# Patient Record
Sex: FEMALE | Race: WHITE | NOT HISPANIC OR LATINO | URBAN - METROPOLITAN AREA
[De-identification: names, ages, dates, MRNs, and addresses within clinical notes are randomized per-mention and may not be internally consistent; named-entity substitution may affect disease eponyms.]

---

## 2020-10-07 ENCOUNTER — OFFICE VISIT (OUTPATIENT)
Dept: URBAN - METROPOLITAN AREA CLINIC 78 | Facility: CLINIC | Age: 75
End: 2020-10-07
Payer: MEDICARE

## 2020-10-07 DIAGNOSIS — K92.2 GI (GASTROINTESTINAL BLEED): ICD-10-CM

## 2020-10-07 DIAGNOSIS — D62 ANEMIA ASSOCIATED WITH ACUTE BLOOD LOSS: ICD-10-CM

## 2020-10-07 DIAGNOSIS — I25.10 CAD (CORONARY ARTERY DISEASE): ICD-10-CM

## 2020-10-07 PROCEDURE — 99204 OFFICE O/P NEW MOD 45 MIN: CPT | Performed by: INTERNAL MEDICINE

## 2020-10-07 RX ORDER — OLMESARTAN MEDOXOMIL 5 MG/1
1 TABLET TABLET ORAL ONCE A DAY
Status: ACTIVE | COMMUNITY

## 2020-10-07 NOTE — HPI-TODAY'S VISIT:
Patient is seen in consultation for ulcer  She has Transfusion in [facility] .and dx with ulcer  2 years ago  She had repeat EGD by Dr Estephania Gillette  2 years ago..Ulcer was still there . She has been bleeding on and off from ulcer because she was not taking her medicine and her  was bad  Saw gastro in [place]  Took her to Dr Capellan..ordered 2 iron infusions ( EGD not covered by insurance ) Patient was hospitalized  1 year ago...Rocky Coreas /NS had repeat EGD by  Dr Estephania Gillette 5/17/10  Non bleeding gastric ulcers   Colonoscopy was done a year or two years ago ..Polyps was removed .   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  BM are ok  Hx of melena 1 month ago.

## 2020-11-06 ENCOUNTER — OFFICE VISIT (OUTPATIENT)
Dept: URBAN - METROPOLITAN AREA SURGERY CENTER 15 | Facility: SURGERY CENTER | Age: 75
End: 2020-11-06
Payer: MEDICARE

## 2020-11-06 DIAGNOSIS — K44.9 DIAPHRAGMATIC HERNIA: ICD-10-CM

## 2020-11-06 DIAGNOSIS — K31.89 ACQUIRED DEFORMITY OF DUODENUM: ICD-10-CM

## 2020-11-06 PROCEDURE — 43239 EGD BIOPSY SINGLE/MULTIPLE: CPT | Performed by: INTERNAL MEDICINE

## 2020-11-06 PROCEDURE — G8907 PT DOC NO EVENTS ON DISCHARG: HCPCS | Performed by: INTERNAL MEDICINE

## 2020-11-06 RX ORDER — OLMESARTAN MEDOXOMIL 5 MG/1
1 TABLET TABLET ORAL ONCE A DAY
Status: ACTIVE | COMMUNITY

## 2021-08-20 ENCOUNTER — OFFICE VISIT (OUTPATIENT)
Dept: URBAN - METROPOLITAN AREA CLINIC 78 | Facility: CLINIC | Age: 76
End: 2021-08-20
Payer: MEDICARE

## 2021-08-20 ENCOUNTER — LAB OUTSIDE AN ENCOUNTER (OUTPATIENT)
Dept: URBAN - METROPOLITAN AREA CLINIC 78 | Facility: CLINIC | Age: 76
End: 2021-08-20

## 2021-08-20 VITALS
DIASTOLIC BLOOD PRESSURE: 71 MMHG | HEART RATE: 61 BPM | HEIGHT: 61 IN | BODY MASS INDEX: 29.49 KG/M2 | WEIGHT: 156.2 LBS | TEMPERATURE: 97.3 F | SYSTOLIC BLOOD PRESSURE: 162 MMHG

## 2021-08-20 DIAGNOSIS — K92.2 GI (GASTROINTESTINAL BLEED): ICD-10-CM

## 2021-08-20 DIAGNOSIS — K25.7: ICD-10-CM

## 2021-08-20 DIAGNOSIS — D64.89 ANEMIA DUE TO OTHER CAUSE: ICD-10-CM

## 2021-08-20 DIAGNOSIS — Z87.11 HISTORY OF PEPTIC ULCER DISEASE: ICD-10-CM

## 2021-08-20 PROCEDURE — 99215 OFFICE O/P EST HI 40 MIN: CPT | Performed by: INTERNAL MEDICINE

## 2021-08-20 RX ORDER — SODIUM PICOSULFATE, MAGNESIUM OXIDE, AND ANHYDROUS CITRIC ACID 10; 3.5; 12 MG/160ML; G/160ML; G/160ML
160ML AS DIRECTED LIQUID ORAL ONCE
Qty: 1 | Refills: 0 | OUTPATIENT
Start: 2021-08-20 | End: 2021-08-21

## 2021-08-20 RX ORDER — OLMESARTAN MEDOXOMIL 5 MG/1
1 TABLET TABLET ORAL ONCE A DAY
Status: ON HOLD | COMMUNITY

## 2021-08-20 RX ORDER — PANTOPRAZOLE SODIUM 40 MG/1
1 TABLET TABLET, DELAYED RELEASE ORAL TWICE A DAY
Status: ACTIVE | COMMUNITY

## 2021-08-20 RX ORDER — PANTOPRAZOLE SODIUM 40 MG/1
1 TABLET TABLET, DELAYED RELEASE ORAL
Qty: 180 | Refills: 2

## 2021-08-20 RX ORDER — SUCRALFATE 1 G/1
1 TABLET TABLET ORAL
Qty: 60 | Refills: 2

## 2021-08-20 RX ORDER — AMIODARONE HYDROCHLORIDE 200 MG/1
1 TABLET TABLET ORAL TWICE A DAY
Status: ACTIVE | COMMUNITY

## 2021-08-20 RX ORDER — CARVEDILOL 3.12 MG/1
1 TABLET WITH FOOD TABLET, FILM COATED ORAL TWICE A DAY
Status: ACTIVE | COMMUNITY

## 2021-08-20 RX ORDER — SUCRALFATE 1 G/1
1 TABLET ON AN EMPTY STOMACH TABLET ORAL
Status: ACTIVE | COMMUNITY

## 2021-08-20 RX ORDER — ATORVASTATIN CALCIUM 20 MG/1
1 TABLET TABLET, FILM COATED ORAL ONCE A DAY
Status: ACTIVE | COMMUNITY

## 2021-08-20 NOTE — HPI-TODAY'S VISIT:
Patient previously seen by .  Patient reports prior history of PUD requiring blood transfusion in [facility] ~ 2016. She had repeat EGD by Dr. Estephania Gillette  ~2019 with reportedly persistence of ulcer.   She used to take Ibuprofen daily x  2 years for back pain. She vehemently denies any use of NSAIDs currently.  She was admitted on 7/17/21 at [facility] with a Hb of 4 and was transfused 2 u pRBCs. Her Hb increased to 7.8. She was seen in consultation by Dr. Lon Burgess who perfomred an EGD and found Alejandro erosions and a superficial. non-bleeding 15mm ulcer. On admission she also had chest pain + nausea/vomting and was found have a NSTEMI. She had a stress test and ECHO. She follows with Dr. Booth- her cardiologist. She is taking Amiodarone and Carvedilol. He is thinking of doing a cardiac cath and possibly startting her on blood thinners.   Since her discharge, she has been feeling better. She has more energy. She has switched more to a plant based. Her weight has been stable. No diarrhea.   She has constipation. She has a BM every couple of days. She is taking Fe daily which she feels is contributing to her constipation.   Last colonoscopy ~2018. She has a personal history of colon polyps.  She denies a FH of colon cancer or colon polyps. Her mother had pancreatic cancer.  Summary of prior workup: - EGD on 7/18/21: Moderate sized HH with Alejandro erosions. Normal gastric antrum and body. No H pylori or malignancy. She had a 15mm superficial gastric ulcer.  - Stress test on 7/20/2021 revealed abnormal tracer distribution in rest and stress images suggesting an infarct however there was a small fixed perfusion abnormality of mild intensity in the inferior wall as well as normal left ventricular wall motion and left ventricular ejection fraction.  Although the study was abnormal they did not see any clear inducible ischemia.  Risk factor modification was recommended. - 2D ECHO on 7/17/2021 revealed a normal sized LV with a mildly reduced systolic function and EF 45%.  Grade 2 diastolic dysfunction.  Mild concentric left ventricular hypertrophy.  Left atrium moderately enlarged, right atrium mildly enlarged.  Normal size and function of the right ventricle.  Aortic valve mildly sclerotic without evidence of aortic regurgitation.  Mild mitral annular calcification.  Mild thickening of the mitral valve leaflet leaflets and mild central mitral regurgitation.  - Blood work on 7/16/2021 revealed a critical troponin level of 3.4.  White blood cell count 15.2, hemoglobin 4.9, MCV 77, platelets 302, sodium 135, potassium 4.9, chloride 105, glucose 154, BUN 38, creatinine 1.2, calcium 9.0. - EGD Nov 2020: No mention of esophagus desscription. 5cm HH. Antral erythema (no H pylori). Normal duodenum (no celiac). - EGD by  Dr. Estephania Gillette 5/17/10: Non bleeding gastric ulcers

## 2021-08-22 LAB
BASO (ABSOLUTE): 0
BASOS: 1
EOS (ABSOLUTE): 0.3
EOS: 6
FERRITIN, SERUM: 84
HEMATOCRIT: 38.8
HEMATOLOGY COMMENTS:: (no result)
HEMOGLOBIN: 12.3
IMMATURE CELLS: (no result)
IMMATURE GRANS (ABS): 0
IMMATURE GRANULOCYTES: 0
IRON BIND.CAP.(TIBC): 368
IRON SATURATION: 15
IRON: 57
LYMPHS (ABSOLUTE): 1.4
LYMPHS: 26
MCH: 26.6
MCHC: 31.7
MCV: 84
MONOCYTES(ABSOLUTE): 0.5
MONOCYTES: 9
NEUTROPHILS (ABSOLUTE): 3.2
NEUTROPHILS: 58
NRBC: (no result)
PLATELETS: 182
RBC: 4.62
RDW: 19.3
TSH: 3.07
UIBC: 311
WBC: 5.4

## 2021-08-31 ENCOUNTER — TELEPHONE ENCOUNTER (OUTPATIENT)
Dept: URBAN - METROPOLITAN AREA CLINIC 78 | Facility: CLINIC | Age: 76
End: 2021-08-31

## 2021-09-23 ENCOUNTER — OFFICE VISIT (OUTPATIENT)
Dept: URBAN - METROPOLITAN AREA MEDICAL CENTER 10 | Facility: MEDICAL CENTER | Age: 76
End: 2021-09-23
Payer: MEDICARE

## 2021-09-23 DIAGNOSIS — K29.60 ADENOPAPILLOMATOSIS GASTRICA: ICD-10-CM

## 2021-09-23 DIAGNOSIS — K31.7 BENIGN GASTRIC POLYP: ICD-10-CM

## 2021-09-23 DIAGNOSIS — D50.9 ANEMIA: ICD-10-CM

## 2021-09-23 DIAGNOSIS — K55.20 ACQUIRED ARTERIOVENOUS MALFORMATION OF COLON: ICD-10-CM

## 2021-09-23 PROCEDURE — 45388 COLONOSCOPY W/ABLATION: CPT | Performed by: INTERNAL MEDICINE

## 2021-09-23 PROCEDURE — 43239 EGD BIOPSY SINGLE/MULTIPLE: CPT | Performed by: INTERNAL MEDICINE

## 2021-09-23 RX ORDER — AMIODARONE HYDROCHLORIDE 200 MG/1
1 TABLET TABLET ORAL TWICE A DAY
Status: ACTIVE | COMMUNITY

## 2021-09-23 RX ORDER — SUCRALFATE 1 G/1
1 TABLET TABLET ORAL
Qty: 60 | Refills: 2 | Status: ACTIVE | COMMUNITY

## 2021-09-23 RX ORDER — ATORVASTATIN CALCIUM 20 MG/1
1 TABLET TABLET, FILM COATED ORAL ONCE A DAY
Status: ACTIVE | COMMUNITY

## 2021-09-23 RX ORDER — PANTOPRAZOLE SODIUM 40 MG/1
1 TABLET TABLET, DELAYED RELEASE ORAL
Qty: 180 | Refills: 2 | Status: ACTIVE | COMMUNITY

## 2021-09-23 RX ORDER — CARVEDILOL 3.12 MG/1
1 TABLET WITH FOOD TABLET, FILM COATED ORAL TWICE A DAY
Status: ACTIVE | COMMUNITY

## 2021-09-23 RX ORDER — OLMESARTAN MEDOXOMIL 5 MG/1
1 TABLET TABLET ORAL ONCE A DAY
Status: ON HOLD | COMMUNITY

## 2022-10-27 ENCOUNTER — DASHBOARD ENCOUNTERS (OUTPATIENT)
Age: 77
End: 2022-10-27

## 2022-10-27 ENCOUNTER — OFFICE VISIT (OUTPATIENT)
Dept: URBAN - METROPOLITAN AREA CLINIC 78 | Facility: CLINIC | Age: 77
End: 2022-10-27
Payer: MEDICARE

## 2022-10-27 DIAGNOSIS — D64.9 ANEMIA, UNSPECIFIED TYPE: ICD-10-CM

## 2022-10-27 DIAGNOSIS — K44.9 LARGE HIATAL HERNIA: ICD-10-CM

## 2022-10-27 DIAGNOSIS — Z86.010 PERSONAL HISTORY OF COLONIC POLYPS: ICD-10-CM

## 2022-10-27 DIAGNOSIS — Z87.11 HISTORY OF PEPTIC ULCER DISEASE: ICD-10-CM

## 2022-10-27 DIAGNOSIS — I25.10 CAD (CORONARY ARTERY DISEASE): ICD-10-CM

## 2022-10-27 DIAGNOSIS — K92.2 GI (GASTROINTESTINAL BLEED): ICD-10-CM

## 2022-10-27 DIAGNOSIS — K55.20 AVM (ARTERIOVENOUS MALFORMATION) OF COLON: ICD-10-CM

## 2022-10-27 DIAGNOSIS — K25.7: ICD-10-CM

## 2022-10-27 PROBLEM — 95530000: Status: ACTIVE | Noted: 2021-08-20

## 2022-10-27 PROBLEM — 53741008 CORONARY ARTERY DISEASE: Status: ACTIVE | Noted: 2020-10-07

## 2022-10-27 PROBLEM — 428283002: Status: ACTIVE | Noted: 2021-08-20

## 2022-10-27 PROCEDURE — 99214 OFFICE O/P EST MOD 30 MIN: CPT | Performed by: INTERNAL MEDICINE

## 2022-10-27 RX ORDER — AMIODARONE HYDROCHLORIDE 200 MG/1
1 TABLET TABLET ORAL TWICE A DAY
Status: ACTIVE | COMMUNITY

## 2022-10-27 RX ORDER — OLMESARTAN MEDOXOMIL 5 MG/1
1 TABLET TABLET ORAL ONCE A DAY
Status: ON HOLD | COMMUNITY

## 2022-10-27 RX ORDER — PANTOPRAZOLE SODIUM 40 MG/1
1 TABLET TABLET, DELAYED RELEASE ORAL
Qty: 180 | Refills: 2

## 2022-10-27 RX ORDER — ATORVASTATIN CALCIUM 20 MG/1
1 TABLET TABLET, FILM COATED ORAL ONCE A DAY
Status: ACTIVE | COMMUNITY

## 2022-10-27 RX ORDER — PANTOPRAZOLE SODIUM 40 MG/1
1 TABLET TABLET, DELAYED RELEASE ORAL
Qty: 180 | Refills: 2 | Status: ACTIVE | COMMUNITY

## 2022-10-27 RX ORDER — SUCRALFATE 1 G/1
1 TABLET TABLET ORAL
Qty: 60 | Refills: 2 | Status: ACTIVE | COMMUNITY

## 2022-10-27 RX ORDER — CARVEDILOL 3.12 MG/1
1 TABLET WITH FOOD TABLET, FILM COATED ORAL TWICE A DAY
Status: ACTIVE | COMMUNITY

## 2022-10-27 RX ORDER — SUCRALFATE 1 G/1
1 TABLET TABLET ORAL
Qty: 60 | Refills: 2

## 2022-10-27 NOTE — HPI-TODAY'S VISIT:
Patient previously seen by .  Patient reports prior history of PUD requiring blood transfusion in [facility] ~ 2016. She had repeat EGD by Dr. Estephania Gillette  ~2019 with reportedly persistence of ulcer.   She used to take Ibuprofen daily x  2 years for back pain. She vehemently denies any use of NSAIDs currently.  She was admitted on 7/17/21 at [facility] with a Hb of 4 and was transfused 2 u pRBCs. Her Hb increased to 7.8. She was seen in consultation by Dr. Lon Burgess who perfomred an EGD and found Alejandro erosions and a superficial. non-bleeding 15mm ulcer. On admission she also had chest pain + nausea/vomting and was found have a NSTEMI. She had a stress test and ECHO. She follows with Dr. Booth- her cardiologist. She is taking Amiodarone and Carvedilol. He is holding off on starting her on blood thinners/antiplatelet agents.   Since her discharge, she has been feeling better. She has more energy. She has switched more to a plant based. Her weight has been stable. No diarrhea.   She had her Hb checked 2 weeks ago and it was 10.6. Her daughter however has seen her getmore fatigued and pale since these labs were drawn. She recently received Fe infusions x 5.  She has constipation. She has a BM every couple of days.   She has a personal history of colon polyps.  She denies a FH of colon cancer or colon polyps. Her mother had pancreatic cancer.  Today we reviewed the results of both her E/C and path.  She has a large HH but the patient is quite hesitant to pursue surgery at this time.  Her diet is very bland and soft. They are looking at getting her new dentures.   Summary of prior workup: - EGD/Colonoscopy by me on 9/23/2021: 6cm HH wihtout Alejandro erosions. Biopsies were negative for celiac sprue or H. pylori.  Fundic gland polyp. Colonoscopy showed a normal terminal ileum, medium sized angiectasia in the cecum status post APC.  Pancolonic diverticulosis as well as nonbleeding internal and external hemorrhoids.  The quality of the prep was good. - Labs on 8/20/21 revealed a WBC of 5.4, hemoglobin 12.3, MCV 84, platelets 182.  Iron 57, percent saturation 15, ferritin 84.  TSH 3.07 - EGD on 7/18/21: Moderate sized HH with Alejandro erosions. Normal gastric antrum and body. No H pylori or malignancy. She had a 15mm superficial gastric ulcer.  - Stress test on 7/20/2021 revealed abnormal tracer distribution in rest and stress images suggesting an infarct however there was a small fixed perfusion abnormality of mild intensity in the inferior wall as well as normal left ventricular wall motion and left ventricular ejection fraction.  Although the study was abnormal they did not see any clear inducible ischemia.  Risk factor modification was recommended. - 2D ECHO on 7/17/2021 revealed a normal sized LV with a mildly reduced systolic function and EF 45%.  Grade 2 diastolic dysfunction.  Mild concentric left ventricular hypertrophy.  Left atrium moderately enlarged, right atrium mildly enlarged.  Normal size and function of the right ventricle.  Aortic valve mildly sclerotic without evidence of aortic regurgitation.  Mild mitral annular calcification.  Mild thickening of the mitral valve leaflet leaflets and mild central mitral regurgitation.  - Blood work on 7/16/2021 revealed a critical troponin level of 3.4.  White blood cell count 15.2, hemoglobin 4.9, MCV 77, platelets 302, sodium 135, potassium 4.9, chloride 105, glucose 154, BUN 38, creatinine 1.2, calcium 9.0. - EGD Nov 2020: No mention of esophagus desscription. 5cm HH. Antral erythema (no H pylori). Normal duodenum (no celiac). - EGD by  Dr. Estephania Gillette 5/17/10: Non bleeding gastric ulcers

## 2022-10-28 LAB
FERRITIN, SERUM: 15
HEMATOCRIT: 24.2
HEMOGLOBIN: 7.6
IRON BIND.CAP.(TIBC): 430
IRON SATURATION: 4
IRON: 16
MCH: 26.7
MCHC: 31.4
MCV: 84.9
MPV: 11.8
PLATELET COUNT: 238
RDW: 14.4
RED BLOOD CELL COUNT: 2.85
WHITE BLOOD CELL COUNT: 8.6

## 2022-11-08 ENCOUNTER — TELEPHONE ENCOUNTER (OUTPATIENT)
Dept: URBAN - METROPOLITAN AREA CLINIC 113 | Facility: CLINIC | Age: 77
End: 2022-11-08

## 2022-11-09 ENCOUNTER — OFFICE VISIT (OUTPATIENT)
Dept: URBAN - METROPOLITAN AREA CLINIC 77 | Facility: CLINIC | Age: 77
End: 2022-11-09

## 2022-11-10 ENCOUNTER — TELEPHONE ENCOUNTER (OUTPATIENT)
Dept: URBAN - METROPOLITAN AREA CLINIC 78 | Facility: CLINIC | Age: 77
End: 2022-11-10

## 2022-11-15 ENCOUNTER — TELEPHONE ENCOUNTER (OUTPATIENT)
Dept: URBAN - METROPOLITAN AREA CLINIC 78 | Facility: CLINIC | Age: 77
End: 2022-11-15

## 2022-11-15 RX ORDER — IRON SUCROSE 20 MG/ML
AS DIRECTED INJECTION, SOLUTION INTRAVENOUS
Qty: 5 VIAL | Refills: 0 | OUTPATIENT
Start: 2022-11-22 | End: 2022-12-06

## 2022-12-02 ENCOUNTER — TELEPHONE ENCOUNTER (OUTPATIENT)
Dept: URBAN - METROPOLITAN AREA CLINIC 63 | Facility: CLINIC | Age: 77
End: 2022-12-02

## 2022-12-05 ENCOUNTER — TELEPHONE ENCOUNTER (OUTPATIENT)
Dept: URBAN - METROPOLITAN AREA CLINIC 23 | Facility: CLINIC | Age: 77
End: 2022-12-05